# Patient Record
(demographics unavailable — no encounter records)

---

## 2024-10-29 NOTE — PROCEDURE
[Locate IUD] : locate IUD [Transvaginal Ultrasound] : transvaginal ultrasound [Anteverted] : anteverted [FreeTextEntry3] : iud in good position  [FreeTextEntry7] : normal ovaries seen b/l [FreeTextEntry4] : iud in good position

## 2024-10-29 NOTE — PHYSICAL EXAM
[Appropriately responsive] : appropriately responsive [Alert] : alert [No Acute Distress] : no acute distress [Soft] : soft [Non-tender] : non-tender [Non-distended] : non-distended [No Lesions] : no lesions [No Mass] : no mass [Oriented x3] : oriented x3 [Labia Majora] : normal [Labia Minora] : normal [Normal] : normal [IUD String] : an IUD string was noted

## 2025-07-16 NOTE — PLAN
[FreeTextEntry1] : annual gyn exam: pap and hpv neg last yr so not needed  also: has a lump in private area and wants to get it checked out *I saw very small skin cyst inner labia right btwn clitoris but no signs infections told to try sitz bath with epsom salt if comes back  kyleena; she is having "prolonged" 2 wk spotting cycle but not enough to consider remove iud office sono today: IUD is in good position, reassured will keep it engaged: not planning any preg soon